# Patient Record
Sex: FEMALE | Race: WHITE | URBAN - METROPOLITAN AREA
[De-identification: names, ages, dates, MRNs, and addresses within clinical notes are randomized per-mention and may not be internally consistent; named-entity substitution may affect disease eponyms.]

---

## 2021-05-09 LAB
ANION GAP SERPL CALCULATED.3IONS-SCNC: NORMAL MMOL/L
BUN SERPL-MCNC: 6 MG/DL
CALCIUM SERPL-MCNC: 8.5 MG/DL
CHLORIDE SERPLBLD-SCNC: 107 MMOL/L
CO2 SERPL-SCNC: 25 MMOL/L
CREAT SERPL-MCNC: 0.69 MG/DL
ERYTHROCYTE [DISTWIDTH] IN BLOOD BY AUTOMATED COUNT: 12.5 %
GFR SERPL CREATININE-BSD FRML MDRD: 110 ML/MIN/1.73M2
GLUCOSE SERPL-MCNC: 92 MG/DL (ref 70–100)
HCT VFR BLD AUTO: 38.5 %
HEMOGLOBIN: 12.6 G/DL (ref 11.5–15.7)
MCH RBC QN AUTO: 30.9 PG
MCHC RBC AUTO-ENTMCNC: 32.7 G/DL
MCV RBC AUTO: 94.4 FL
PLATELET # BLD AUTO: 218 10^9/L
POTASSIUM SERPL-SCNC: 3.9 MMOL/L
RBC # BLD AUTO: 4.08 10^12/L
SODIUM SERPL-SCNC: 141 MMOL/L
WBC # BLD AUTO: 7.25 10^9/L

## 2021-05-11 ENCOUNTER — OFFICE VISIT (OUTPATIENT)
Dept: URGENT CARE | Facility: URGENT CARE | Age: 41
End: 2021-05-11
Payer: COMMERCIAL

## 2021-05-11 VITALS
BODY MASS INDEX: 21.69 KG/M2 | DIASTOLIC BLOOD PRESSURE: 70 MMHG | TEMPERATURE: 97.7 F | RESPIRATION RATE: 14 BRPM | SYSTOLIC BLOOD PRESSURE: 107 MMHG | WEIGHT: 135 LBS | HEART RATE: 65 BPM | HEIGHT: 66 IN | OXYGEN SATURATION: 97 %

## 2021-05-11 DIAGNOSIS — K12.2 ORAL INFECTION: Primary | ICD-10-CM

## 2021-05-11 PROCEDURE — 99203 OFFICE O/P NEW LOW 30 MIN: CPT | Performed by: FAMILY MEDICINE

## 2021-05-11 RX ORDER — SENNOSIDES A AND B 8.6 MG/1
8.6 TABLET, FILM COATED ORAL DAILY
COMMUNITY
Start: 2021-05-09

## 2021-05-11 RX ORDER — OXYCODONE HYDROCHLORIDE 5 MG/1
5 TABLET ORAL EVERY 8 HOURS PRN
COMMUNITY
Start: 2021-05-09 | End: 2021-05-11

## 2021-05-11 RX ORDER — ACETAMINOPHEN 500 MG
500 TABLET ORAL EVERY 6 HOURS PRN
COMMUNITY
Start: 2021-05-09

## 2021-05-11 RX ORDER — LEVETIRACETAM 500 MG/1
2000 TABLET ORAL 2 TIMES DAILY
COMMUNITY
Start: 2021-05-09 | End: 2021-05-16

## 2021-05-11 RX ORDER — ONDANSETRON 4 MG/1
4 TABLET, FILM COATED ORAL PRN
COMMUNITY
Start: 2021-05-09

## 2021-05-11 ASSESSMENT — MIFFLIN-ST. JEOR: SCORE: 1304.11

## 2021-05-11 NOTE — PROGRESS NOTES
"SUBJECTIVE: Asia Brown is a 41 year old female presenting with a chief complaint of oral infection.  Onset of symptoms was day(s) ago.  Course of illness is worsening.    Current and Associated symptoms: none  Treatment measures tried include None tried.  Predisposing factors include sutures placed.    No past medical history on file.  Allergies   Allergen Reactions     Liquid Adhesive Rash     Some Band Aids     Moxifloxacin Rash     Other reaction(s): Hives     Social History     Tobacco Use     Smoking status: Never Smoker     Smokeless tobacco: Never Used   Substance Use Topics     Alcohol use: Not on file       ROS:  SKIN: no rash  GI: no vomiting    OBJECTIVE:  /70 (BP Location: Right arm, Patient Position: Sitting, Cuff Size: Adult Regular)   Pulse 65   Temp 97.7  F (36.5  C) (Tympanic)   Resp 14   Ht 1.676 m (5' 6\")   Wt 61.2 kg (135 lb)   SpO2 97%   BMI 21.79 kg/m  GENERAL APPEARANCE: healthy, alert and no distress  Inner lower lip with discontinue and redness, slight swelling      ICD-10-CM    1. Oral infection  K12.2 amoxicillin-clavulanate (AUGMENTIN) 875-125 MG tablet       Fluids/Rest, f/u if worse/not any better    "

## 2021-05-12 ENCOUNTER — TRANSFERRED RECORDS (OUTPATIENT)
Dept: HEALTH INFORMATION MANAGEMENT | Facility: CLINIC | Age: 41
End: 2021-05-12

## 2021-05-18 ENCOUNTER — MEDICAL CORRESPONDENCE (OUTPATIENT)
Dept: HEALTH INFORMATION MANAGEMENT | Facility: CLINIC | Age: 41
End: 2021-05-18

## 2021-05-20 ENCOUNTER — PRE VISIT (OUTPATIENT)
Dept: CARDIOLOGY | Facility: CLINIC | Age: 41
End: 2021-05-20

## 2021-05-20 NOTE — TELEPHONE ENCOUNTER
Faxed [place] to [facility] [place]KG 5/8/2021.    Faxed request to N for notes from Dr. Metzger (referral to cardiology)

## 2021-05-26 NOTE — PROGRESS NOTES
HPI and Plan:    Ms. Asia Brown is 41 years old and experienced an episode of syncope.  [place] [facility] [place]ology follow-up was recommended by her neurologist.    With her episode of syncope, she sustained a concussion.  For several weeks preceding the event, she had awakened in the early morning hours and had difficulty sleeping.  She had 4 drinks of alcohol with dinner the night prior to the event - that was unusual in that she has perhaps 2 drinks in a month.  Apparently she awakened and went to the refrigerator and lost consciousness without typical warning symptoms.  Her  found her and she had no memory of events. With her [facility] [place], a lip laceration was repaired by the facial trauma team. Her repeat imaging did not demonstrate a previously-suspected subarachnoid hemorrhage and she remained without neurologic symptoms. Given concern for syncope, medicine was consulted and felt her presentation was consistent with vasovagal episode. No follow-up or additional work-up recommended. She does have a history of migraine headaches and sees Dr. Metzger at the [place] [facility].  She recommended a cardiology evaluation after the EEG was not suspicious for seizure activity.    Ms. Brown fainted twice as a child -apparently in [place].  She has had several events as an adult generally precipitated by pain or by medical procedures.  She typically has symptoms such as feeling warm, experiencing diaphoresis or lightheadedness and understands to sit down.  It sounds as though her events associated with medical procedures do not always occur in a standing position.  She denies any palpitations or chest discomfort.  She has noted shortness of breath with exertion since the event.  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  She has attributed the shortness of  breath to deconditioning and would like to gradually start exercising again.  She is currently on leave due to her concussion using PTO.  Prior to the event, she also had experienced 2 weeks of headaches which awakened her at night.  After sustaining the concussion, that symptom resolved.  She does not believe that she snores or has sleep apnea.    She notes her migraine headaches generally start as tension headaches and then turn into migraine headaches.  Her job has resulted in significant stress and is relatively high pressure in character.  At the time of her last syncopal event, she was also on propranolol for the migraine headaches though it did not seem to be helpful.  Of note, her father experienced a myocardial infarct at age 50 years and her mother experienced a myocardial infarct at age 52 years (she did smoke tobacco) and she has subsequently  of lung cancer.  She has at least 2 uncles who also experienced coronary events at relatively young ages.  She has not previously had significant dyslipidemia, does not smoke or have diabetes mellitus or have hypertension.    Exam:  The blood pressure was 117/78 mmHg.  This is a woman who appears relatively fit and a very normal weight.  The chest was clear to auscultation.  On cardiac auscultation, there was an S1 and S2 without extra sounds or murmur.  The rhythm was regular.    Assessment/Plan:  In assessment, Ms. Brown has a history that is consistent with vasovagal syncope.  This last more dramatic episode may have been precipitated by a combination of sleep deprivation preceding the event and alcohol the night before the event.  We discussed this in detail.  Nonetheless, further evaluation is indicated particularly given her more recent exertional dyspnea and her family history of significant coronary artery disease.  A stress echocardiogram was recommended both to evaluate the possible presence of structural heart disease and to evaluate her exercise  tolerance as well as the blood pressure and heart rate response to exercise.  Additionally, a 7-day Zio patch monitor was recommended to screen for unsuspected arrhythmias.  Follow-up with electrophysiology was recommended.  We discussed the spectrum of symptoms and possible triggers with vasovagal syncope and she has become aware of many of the triggers trying to avoid actual loss of consciousness.    Sleep deprivation increases likelihood of more symptomatic events with vagal hypersensitivity.  She may benefit from a sleep study even if sleep apnea is not the concern.  This was discussed so that it can be pursued if insomnia persists.    Finally, she has a very significant history of premature coronary disease in both parents.  Reevaluation of her risk factors (particularly lipids) and consideration of initiation of lipid-lowering therapy is likely indicated.  Follow-up with cardiology (and apart from electrophysiology) is recommended.  Given my impending care home, I have recommended follow-up with another of my partners.

## 2021-05-27 ENCOUNTER — OFFICE VISIT (OUTPATIENT)
Dept: CARDIOLOGY | Facility: CLINIC | Age: 41
End: 2021-05-27
Payer: COMMERCIAL

## 2021-05-27 VITALS
SYSTOLIC BLOOD PRESSURE: 117 MMHG | HEART RATE: 71 BPM | DIASTOLIC BLOOD PRESSURE: 78 MMHG | WEIGHT: 135 LBS | HEIGHT: 66 IN | BODY MASS INDEX: 21.69 KG/M2

## 2021-05-27 DIAGNOSIS — Z82.49 FAMILY HISTORY OF ISCHEMIC HEART DISEASE: ICD-10-CM

## 2021-05-27 DIAGNOSIS — R55 VASOVAGAL SYNCOPE: Primary | ICD-10-CM

## 2021-05-27 PROCEDURE — 99204 OFFICE O/P NEW MOD 45 MIN: CPT | Performed by: INTERNAL MEDICINE

## 2021-05-27 PROCEDURE — 93000 ELECTROCARDIOGRAM COMPLETE: CPT | Performed by: INTERNAL MEDICINE

## 2021-05-27 ASSESSMENT — MIFFLIN-ST. JEOR: SCORE: 1303.86

## 2021-05-27 NOTE — LETTER
5/27/2021    Eboni Nelson MD  [facility] [address]  [facility] [address]    RE: Asia Brown       Dear Colleague,    I had the pleasure of seeing Asia Brown in the [facility].    HPI and Plan:    Ms. Asia Brown is 41 years old and experienced an episode of syncope.  She [place] [facility] and cardiology follow-up was recommended by her neurologist.    With her episode of syncope, she sustained a concussion.  For several weeks preceding the event, she had awakened in the early morning hours and had difficulty sleeping.  She had 4 drinks of alcohol with dinner the night prior to the event - that was unusual in that she has perhaps 2 drinks in a month.  Apparently she awakened and went to the refrigerator and lost consciousness without typical warning symptoms.  Her  found her and she had no memory of events. With her [facility] [place], a lip laceration was repaired by the facial trauma team. Her repeat imaging did not demonstrate a previously-suspected subarachnoid hemorrhage and she remained without neurologic symptoms. Given concern for syncope, medicine was consulted and felt her presentation was consistent with vasovagal episode. No follow-up or additional work-up recommended. She does have a history of migraine headaches and sees Dr. Metzger at the [place] [facility].  She recommended a cardiology evaluation after the EEG was not suspicious for seizure activity.    Ms. Brown fainted twice as a child -apparently in [place].  She has had several events as an adult generally precipitated by pain or by medical procedures.  She typically has symptoms such as feeling warm, experiencing diaphoresis or lightheadedness and understands to sit down.  It sounds as though her events associated with medical procedures do not always occur in a standing position.  She denies any palpitations or chest  discomfort.  She has noted shortness of breath with exertion since the event.  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  She has attributed the shortness of breath to deconditioning and would like to gradually start exercising again.  She is currently on leave due to her concussion using PTO.  Prior to the event, she also had experienced 2 weeks of headaches which awakened her at night.  After sustaining the concussion, that symptom resolved.  She does not believe that she snores or has sleep apnea.    She notes her migraine headaches generally start as tension headaches and then turn into migraine headaches.  Her job has resulted in significant stress and is relatively high pressure in character.  At the time of her last syncopal event, she was also on propranolol for the migraine headaches though it did not seem to be helpful.  Of note, her father experienced a myocardial infarct at age 50 years and her mother experienced a myocardial infarct at age 52 years (she did smoke tobacco) and she has subsequently  of lung cancer.  She has at least 2 uncles who also experienced coronary events at relatively young ages.  She has not previously had significant dyslipidemia, does not smoke or have diabetes mellitus or have hypertension.    Exam:  The blood pressure was 117/78 mmHg.  This is a woman who appears relatively fit and a very normal weight.  The chest was clear to auscultation.  On cardiac auscultation, there was an S1 and S2 without extra sounds or murmur.  The rhythm was regular.    Assessment/Plan:  In assessment, Ms. Brown has a history that is consistent with vasovagal syncope.  This last more dramatic episode may have been precipitated by a combination of sleep deprivation preceding the event and alcohol the night before the event.  We discussed this in detail.  Nonetheless, further  evaluation is indicated particularly given her more recent exertional dyspnea and her family history of significant coronary artery disease.  A stress echocardiogram was recommended both to evaluate the possible presence of structural heart disease and to evaluate her exercise tolerance as well as the blood pressure and heart rate response to exercise.  Additionally, a 7-day Zio patch monitor was recommended to screen for unsuspected arrhythmias.  Follow-up with electrophysiology was recommended.  We discussed the spectrum of symptoms and possible triggers with vasovagal syncope and she has become aware of many of the triggers trying to avoid actual loss of consciousness.    Sleep deprivation increases likelihood of more symptomatic events with vagal hypersensitivity.  She may benefit from a sleep study even if sleep apnea is not the concern.  This was discussed so that it can be pursued if insomnia persists.    Finally, she has a very significant history of premature coronary disease in both parents.  Reevaluation of her risk factors (particularly lipids) and consideration of initiation of lipid-lowering therapy is likely indicated.  Follow-up with cardiology (and apart from electrophysiology) is recommended.  Given my impending senior care, I have recommended follow-up with another of my partners.      Thank you for allowing me to participate in the care of your patient.      Sincerely,     America Keane MD     [facility]  cc:   No referring provider defined for this encounter.

## 2021-05-27 NOTE — LETTER
Date:July 20, 2021      Patient was self referred, no letter generated. Do not send.        [facility] Information

## 2021-06-16 ENCOUNTER — HOSPITAL ENCOUNTER (OUTPATIENT)
Dept: CARDIOLOGY | Facility: CLINIC | Age: 41
End: 2021-06-16
Attending: INTERNAL MEDICINE
Payer: COMMERCIAL

## 2021-06-16 ENCOUNTER — TELEPHONE (OUTPATIENT)
Dept: CARDIOLOGY | Facility: CLINIC | Age: 41
End: 2021-06-16

## 2021-06-16 DIAGNOSIS — R55 VASOVAGAL SYNCOPE: ICD-10-CM

## 2021-06-16 DIAGNOSIS — R55 VASOVAGAL SYNCOPE: Primary | ICD-10-CM

## 2021-06-16 PROCEDURE — C8928 TTE W OR W/O FOL W/CON,STRES: HCPCS

## 2021-06-16 PROCEDURE — 999N000208 ECHO STRESS ECHOCARDIOGRAM

## 2021-06-16 PROCEDURE — 93321 DOPPLER ECHO F-UP/LMTD STD: CPT | Mod: 26 | Performed by: INTERNAL MEDICINE

## 2021-06-16 PROCEDURE — 255N000002 HC RX 255 OP 636: Performed by: INTERNAL MEDICINE

## 2021-06-16 PROCEDURE — 93016 CV STRESS TEST SUPVJ ONLY: CPT | Performed by: INTERNAL MEDICINE

## 2021-06-16 PROCEDURE — 93350 STRESS TTE ONLY: CPT | Mod: 26 | Performed by: INTERNAL MEDICINE

## 2021-06-16 PROCEDURE — 93244 EXT ECG>48HR<7D REV&INTERPJ: CPT | Performed by: INTERNAL MEDICINE

## 2021-06-16 PROCEDURE — 93018 CV STRESS TEST I&R ONLY: CPT | Performed by: INTERNAL MEDICINE

## 2021-06-16 PROCEDURE — 93242 EXT ECG>48HR<7D RECORDING: CPT

## 2021-06-16 PROCEDURE — 93325 DOPPLER ECHO COLOR FLOW MAPG: CPT | Mod: 26 | Performed by: INTERNAL MEDICINE

## 2021-06-16 RX ADMIN — HUMAN ALBUMIN MICROSPHERES AND PERFLUTREN 9 ML: 10; .22 INJECTION, SOLUTION INTRAVENOUS at 09:19

## 2021-06-16 NOTE — TELEPHONE ENCOUNTER
Stress echo 6-16-21  1. Above average exercise capacity, 97% max HR achieved.  2. The patient exhibited no chest pain during exercise.  3. This was a normal stress EKG with no evidence of stress-induced ischemia.  4. Rest echo: Normal left ventricular function and wall motion at rest. The  visual ejection fraction is estimated at 55-60%.  5. Stress echo: This was a normal stress echocardiogram with no evidence of  stress-induced ischemia. The visual ejection fraction is estimated at 65-70%    ZIO patch placed 6-16-21  Dr. Medrano visit 7-16-21    Last Dr. Keane visit 5-27-21 - episode of syncope, she sustained a concussion.   stress echocardiogram was recommended both to evaluate the possible presence of structural heart disease and to evaluate her exercise tolerance as well as the blood pressure and heart rate response to exercise.  Additionally, a 7-day Zio patch monitor    Dr. Keane to review

## 2021-06-17 NOTE — TELEPHONE ENCOUNTER
America Keane MD Theis, Marcie J, RN    Cc: [facility] Team 2   Caller: Unspecified (Yesterday, 10:02 AM)             Good result and HR, BP responses wee normal.   Thanks.   CD        Spoke to patient, reviewed stress echo and message from Dr Keane as above. Pt verbalized understanding, no questions at this time. Team 2 will follow up with patient once monitor results are available.

## 2021-07-16 ENCOUNTER — OFFICE VISIT (OUTPATIENT)
Dept: CARDIOLOGY | Facility: CLINIC | Age: 41
End: 2021-07-16
Payer: COMMERCIAL

## 2021-07-16 VITALS
DIASTOLIC BLOOD PRESSURE: 82 MMHG | BODY MASS INDEX: 21.38 KG/M2 | HEIGHT: 66 IN | SYSTOLIC BLOOD PRESSURE: 121 MMHG | WEIGHT: 133 LBS | HEART RATE: 77 BPM

## 2021-07-16 DIAGNOSIS — R55 VASOVAGAL SYNCOPE: ICD-10-CM

## 2021-07-16 PROCEDURE — 99204 OFFICE O/P NEW MOD 45 MIN: CPT | Performed by: INTERNAL MEDICINE

## 2021-07-16 ASSESSMENT — MIFFLIN-ST. JEOR: SCORE: 1295.03

## 2021-07-16 NOTE — LETTER
7/16/2021    Eboni Nelson MD  [facility] [address]  [facility] [address]    RE: Asia DAQUAN Brown       Dear Colleague,    I had the pleasure of seeing Asia DAQUAN Brown in the [facility].    Electrophysiology/ Clinic Note         H&P and Plan:     REASON FOR VISIT: Electrophysiology evaluation.      HISTORY OF PRESENT ILLNESS: Patient is a 41-year-old lady with a history of migraine, who is here for evaluation of syncope.    Patient has a long history of syncope.  She has had 4 episodes of syncope in the past most episodes are preceded by lightheadedness dizziness and diaphoresis.  She is known to lay down and abort her symptoms.    May of this year, she had more pronounced episode.  She woke up and went to the refrigerator.  She became lightheaded and lost consciousness.  She remembers she was in the floor.  Due to the episode of syncope, she lost her 2 front teeth.  [place] [facility] [place] was also found to have a subarachnoid hemorrhage.  Repeated scans showed resolution of the hemorrhage.    She was evaluated in clinic care by my colleague Dr. Keane (who recently retired), and underwent an extensive work-up including a stress echo which was negative for ischemia and revealed normal cardiac function.  Zio patch monitor was also negative for arrhythmias..    At the moment, she is doing well and denies any recurrence of syncope.  She continues to be active and walks daily and does yoga twice a week.  She denies any other symptoms of chest pain, shortness of breath or lightheadedness.    Baseline EKG showed normal sinus rhythm with out signs of preexcitation.        ASSESSMENT AND PLAN:   1.    Syncope.  Clinical picture is suggestive of vasovagal.  Cardiac function is normal, stress test was negative and monitor was unrevealing.  Today, we discussed possibly of loop recorder implantation for close monitoring.  However she is not  "interested at this time.    I recommend the following:  - Increase hydration and salt intake.  - Consider having salt tablets handing  - Increase aerobic activity   - Consider wearing compression stockings  - Avoid  hot crowded environments.  - Avoid standing or sitting for a long time periods of time.  If you to stay in these positions for long period of time, consider tense your leg muscles to help keep blood moving.  - [place] of supine position if you become lightheaded or dizzy.    She will follow-up with us on as-needed basis.    2.  Hyperlipidemia.  She had significant premature family history of coronary disease.  Her recent cholesterol level was markedly elevated (total cholesterol of 270, LDL of 190 and HDL 42).  I recommend strict with a Mediterranean diet.  She will recheck lipids with PCP in a couple months.  She will follow-up with our preventive cardiology in the near to reevaluate hyperlipidemia.      Garth Medrano MD    Physical Exam:  Vitals: /82   Pulse 77   Ht 1.676 m (5' 6\")   Wt 60.3 kg (133 lb)   BMI 21.47 kg/m      Constitutional:  AAO x3.  Pt is in NAD.  HEAD: normocephalic.  SKIN: Skin normal color, texture and turgor with no lesions or eruptions.  Eyes: PERRL, EOMI.  ENT:  Supple, normal JVP. No lymphadenopathy or thyroid enlargement.  Chest:  CTAB  Cardiac: RRR, normal  S1 and S2.  No murmurs rubs or gallop.    Abdomen:  Normal BS.  Soft, non-tender and non-distended.  No rebound or guarding.    Extremities:  Pedious pulses palpable B/L.  No LE edema noticed.   Neurological: Strength and sensation grossly symmetric and intact throughout.       CURRENT MEDICATIONS:  Current Outpatient Medications   Medication Sig Dispense Refill     acetaminophen (TYLENOL) 500 MG tablet Take 500 mg by mouth every 6 hours as needed       Multiple Vitamins-Minerals (WOMENS MULTI VITAMIN & MINERAL PO) Take by mouth daily       ondansetron (ZOFRAN) 4 MG tablet Take 4 mg by mouth as needed       " senna (SENOKOT) 8.6 MG tablet Take 8.6 mg by mouth daily       levETIRAcetam (KEPPRA) 500 MG tablet Take 2,000 mg by mouth 2 times daily         ALLERGIES     Allergies   Allergen Reactions     Liquid Adhesive Rash     Some Band Aids     Moxifloxacin Rash     Other reaction(s): Hives       PAST MEDICAL HISTORY:  Past Medical History:   Diagnosis Date     Migraine      Vasovagal syncope     May 2021       PAST SURGICAL HISTORY:  No past surgical history on file.    FAMILY HISTORY:  History reviewed. No pertinent family history.    SOCIAL HISTORY:  Social History     Socioeconomic History     Marital status:      Spouse name: None     Number of children: None     Years of education: None     Highest education level: None   Occupational History     None   Tobacco Use     Smoking status: Never Smoker     Smokeless tobacco: Never Used   Substance and Sexual Activity     Alcohol use: Not Currently     Drug use: None     Sexual activity: None   Other Topics Concern     Parent/sibling w/ CABG, MI or angioplasty before 65F 55M? Not Asked   Social History Narrative     None     Social Determinants of Health     Financial Resource Strain:      Difficulty of Paying Living Expenses:    Food Insecurity:      Worried About Running Out of Food in the Last Year:      Ran Out of Food in the Last Year:    Transportation Needs:      Lack of Transportation (Medical):      Lack of Transportation (Non-Medical):    Physical Activity:      Days of Exercise per Week:      Minutes of Exercise per Session:    Stress:      Feeling of Stress :    Social Connections:      Frequency of Communication with Friends and Family:      Frequency of Social Gatherings with Friends and Family:      Attends [employer] Services:      Active Member of Clubs or Organizations:      Attends Club or Organization Meetings:      Marital Status:    Intimate Partner Violence:      Fear of Current or Ex-Partner:      Emotionally Abused:      Physically Abused:       Sexually Abused:        Review of Systems:  Skin:  Negative     Eyes:  Negative    ENT:  Negative    Respiratory:  Positive for dyspnea on exertion  Cardiovascular:  Negative for;lightheadedness;palpitations fatigue;Positive for;syncope or near-syncope  Gastroenterology: Negative    Genitourinary:  Negative    Musculoskeletal:  Positive for neck pain  Neurologic:  Positive for headaches  Psychiatric:  Positive for anxiety  Heme/Lymph/Imm:  Negative    Endocrine:  Negative        Recent Lab Results:  LIPID RESULTS:  No results found for: CHOL, HDL, LDL, TRIG, CHOLHDLRATIO    LIVER ENZYME RESULTS:  No results found for: AST, ALT    CBC RESULTS:  Lab Results   Component Value Date    WBC 7.25 05/09/2021    RBC 4.08 05/09/2021    HGB 12.6 05/09/2021    HCT 38.5 05/09/2021    MCV 94.4 05/09/2021    MCH 30.9 05/09/2021    MCHC 32.7 05/09/2021    RDW 12.5 05/09/2021     05/09/2021       BMP RESULTS:  Lab Results   Component Value Date     05/09/2021    POTASSIUM 3.9 05/09/2021    CHLORIDE 107 05/09/2021    CO2 25 05/09/2021    GLC 92 05/09/2021    BUN 6 05/09/2021    CR 0.69 05/09/2021    GFRESTIMATED 110 05/09/2021    MIRYAM 8.5 05/09/2021        A1C RESULTS:  No results found for: A1C    INR RESULTS:  No results found for: INR      ECHOCARDIOGRAM  No results found for this or any previous visit (from the past 8760 hour(s)).      No orders of the defined types were placed in this encounter.    No orders of the defined types were placed in this encounter.    There are no discontinued medications.      Encounter Diagnosis   Name Primary?     Vasovagal syncope          CC  America Keane MD  [address]  [place] [address]                  Thank you for allowing me to participate in the care of your patient.      Sincerely,     Garth Medrano MD     [facility]  cc:   America Keane MD  [address]  [place] [address]

## 2021-07-16 NOTE — PROGRESS NOTES
Electrophysiology/ Clinic Note         H&P and Plan:     REASON FOR VISIT: Electrophysiology evaluation.      HISTORY OF PRESENT ILLNESS: Patient is a 41-year-old lady with a history of migraine, who is here for evaluation of syncope.    Patient has a long history of syncope.  She has had 4 episodes of syncope in the past most episodes are preceded by lightheadedness dizziness and diaphoresis.  She is known to lay down and abort her symptoms.    May of this year, she had more pronounced episode.  She woke up and went to the refrigerator.  She became lightheaded and lost consciousness.  She remembers she was in the floor.  Due to the episode of syncope, she lost her 2 front teeth.  [place] [facility] [place] also found to have a subarachnoid hemorrhage.  Repeated scans showed resolution of the hemorrhage.    She was evaluated in clinic care by my colleague Dr. Keane (who recently retired), and underwent an extensive work-up including a stress echo which was negative for ischemia and revealed normal cardiac function.  Zio patch monitor was also negative for arrhythmias..    At the moment, she is doing well and denies any recurrence of syncope.  She continues to be active and walks daily and does yoga twice a week.  She denies any other symptoms of chest pain, shortness of breath or lightheadedness.    Baseline EKG showed normal sinus rhythm with out signs of preexcitation.        ASSESSMENT AND PLAN:   1.    Syncope.  Clinical picture is suggestive of vasovagal.  Cardiac function is normal, stress test was negative and monitor was unrevealing.  Today, we discussed possibly of loop recorder implantation for close monitoring.  However she is not interested at this time.    I recommend the following:  - Increase hydration and salt intake.  - Consider having salt tablets handing  - Increase aerobic activity   - Consider wearing compression stockings  - Avoid  hot crowded environments.  - Avoid standing or sitting for a  "long time periods of time.  If you to stay in these positions for long period of time, consider tense your leg muscles to help keep blood moving.  - Ten Sleep of supine position if you become lightheaded or dizzy.    She will follow-up with us on as-needed basis.    2.  Hyperlipidemia.  She had significant premature family history of coronary disease.  Her recent cholesterol level was markedly elevated (total cholesterol of 270, LDL of 190 and HDL 42).  I recommend strict with a Mediterranean diet.  She will recheck lipids with PCP in a couple months.  She will follow-up with our preventive cardiology in the near to reevaluate hyperlipidemia.      Garth Medrano MD    Physical Exam:  Vitals: /82   Pulse 77   Ht 1.676 m (5' 6\")   Wt 60.3 kg (133 lb)   BMI 21.47 kg/m      Constitutional:  AAO x3.  Pt is in NAD.  HEAD: normocephalic.  SKIN: Skin normal color, texture and turgor with no lesions or eruptions.  Eyes: PERRL, EOMI.  ENT:  Supple, normal JVP. No lymphadenopathy or thyroid enlargement.  Chest:  CTAB  Cardiac: RRR, normal  S1 and S2.  No murmurs rubs or gallop.    Abdomen:  Normal BS.  Soft, non-tender and non-distended.  No rebound or guarding.    Extremities:  Pedious pulses palpable B/L.  No LE edema noticed.   Neurological: Strength and sensation grossly symmetric and intact throughout.       CURRENT MEDICATIONS:  Current Outpatient Medications   Medication Sig Dispense Refill     acetaminophen (TYLENOL) 500 MG tablet Take 500 mg by mouth every 6 hours as needed       Multiple Vitamins-Minerals (WOMENS MULTI VITAMIN & MINERAL PO) Take by mouth daily       ondansetron (ZOFRAN) 4 MG tablet Take 4 mg by mouth as needed       senna (SENOKOT) 8.6 MG tablet Take 8.6 mg by mouth daily       levETIRAcetam (KEPPRA) 500 MG tablet Take 2,000 mg by mouth 2 times daily         ALLERGIES     Allergies   Allergen Reactions     Liquid Adhesive Rash     Some Band Aids     Moxifloxacin Rash     Other reaction(s): " Hives       PAST MEDICAL HISTORY:  Past Medical History:   Diagnosis Date     Migraine      Vasovagal syncope     May 2021       PAST SURGICAL HISTORY:  No past surgical history on file.    FAMILY HISTORY:  History reviewed. No pertinent family history.    SOCIAL HISTORY:  Social History     Socioeconomic History     Marital status:      Spouse name: None     Number of children: None     Years of education: None     Highest education level: None   Occupational History     None   Tobacco Use     Smoking status: Never Smoker     Smokeless tobacco: Never Used   Substance and Sexual Activity     Alcohol use: Not Currently     Drug use: None     Sexual activity: None   Other Topics Concern     Parent/sibling w/ CABG, MI or angioplasty before 65F 55M? Not Asked   Social History Narrative     None     Social Determinants of Health     Financial Resource Strain:      Difficulty of Paying Living Expenses:    Food Insecurity:      Worried About Running Out of Food in the Last Year:      Ran Out of Food in the Last Year:    Transportation Needs:      Lack of Transportation (Medical):      Lack of Transportation (Non-Medical):    Physical Activity:      Days of Exercise per Week:      Minutes of Exercise per Session:    Stress:      Feeling of Stress :    Social Connections:      Frequency of Communication with Friends and Family:      Frequency of Social Gatherings with Friends and Family:      Attends [employer] Services:      Active Member of Clubs or Organizations:      Attends Club or Organization Meetings:      Marital Status:    Intimate Partner Violence:      Fear of Current or Ex-Partner:      Emotionally Abused:      Physically Abused:      Sexually Abused:        Review of Systems:  Skin:  Negative     Eyes:  Negative    ENT:  Negative    Respiratory:  Positive for dyspnea on exertion  Cardiovascular:  Negative for;lightheadedness;palpitations fatigue;Positive for;syncope or near-syncope  Gastroenterology:  Negative    Genitourinary:  Negative    Musculoskeletal:  Positive for neck pain  Neurologic:  Positive for headaches  Psychiatric:  Positive for anxiety  Heme/Lymph/Imm:  Negative    Endocrine:  Negative        Recent Lab Results:  LIPID RESULTS:  No results found for: CHOL, HDL, LDL, TRIG, CHOLHDLRATIO    LIVER ENZYME RESULTS:  No results found for: AST, ALT    CBC RESULTS:  Lab Results   Component Value Date    WBC 7.25 05/09/2021    RBC 4.08 05/09/2021    HGB 12.6 05/09/2021    HCT 38.5 05/09/2021    MCV 94.4 05/09/2021    MCH 30.9 05/09/2021    MCHC 32.7 05/09/2021    RDW 12.5 05/09/2021     05/09/2021       BMP RESULTS:  Lab Results   Component Value Date     05/09/2021    POTASSIUM 3.9 05/09/2021    CHLORIDE 107 05/09/2021    CO2 25 05/09/2021    GLC 92 05/09/2021    BUN 6 05/09/2021    CR 0.69 05/09/2021    GFRESTIMATED 110 05/09/2021    MIRYAM 8.5 05/09/2021        A1C RESULTS:  No results found for: A1C    INR RESULTS:  No results found for: INR      ECHOCARDIOGRAM  No results found for this or any previous visit (from the past 8760 hour(s)).      No orders of the defined types were placed in this encounter.    No orders of the defined types were placed in this encounter.    There are no discontinued medications.      Encounter Diagnosis   Name Primary?     Vasovagal syncope          CC  America Keane MD  [address]  [place] [id number]

## 2025-05-05 ENCOUNTER — TRANSFERRED RECORDS (OUTPATIENT)
Dept: HEALTH INFORMATION MANAGEMENT | Facility: CLINIC | Age: 45
End: 2025-05-05
Payer: COMMERCIAL

## 2025-05-16 ENCOUNTER — MEDICAL CORRESPONDENCE (OUTPATIENT)
Dept: HEALTH INFORMATION MANAGEMENT | Facility: CLINIC | Age: 45
End: 2025-05-16
Payer: COMMERCIAL

## 2025-05-19 ENCOUNTER — TRANSCRIBE ORDERS (OUTPATIENT)
Dept: OTHER | Age: 45
End: 2025-05-19

## 2025-05-19 ENCOUNTER — PATIENT OUTREACH (OUTPATIENT)
Dept: CARE COORDINATION | Facility: CLINIC | Age: 45
End: 2025-05-19
Payer: COMMERCIAL

## 2025-05-19 DIAGNOSIS — N39.0 FREQUENT UTI: Primary | ICD-10-CM

## 2025-05-20 ENCOUNTER — PATIENT OUTREACH (OUTPATIENT)
Dept: CARE COORDINATION | Facility: CLINIC | Age: 45
End: 2025-05-20
Payer: COMMERCIAL

## 2025-05-21 ENCOUNTER — LAB (OUTPATIENT)
Dept: LAB | Facility: CLINIC | Age: 45
End: 2025-05-21
Payer: COMMERCIAL

## 2025-05-21 DIAGNOSIS — J32.9 FREQUENT EPISODES OF SINUSITIS: ICD-10-CM

## 2025-05-21 DIAGNOSIS — N39.0 UTI (URINARY TRACT INFECTION): ICD-10-CM

## 2025-05-21 LAB
ALBUMIN UR-MCNC: NEGATIVE MG/DL
APPEARANCE UR: CLEAR
BACTERIA #/AREA URNS HPF: ABNORMAL /HPF
BILIRUB UR QL STRIP: NEGATIVE
COLOR UR AUTO: YELLOW
GLUCOSE UR STRIP-MCNC: NEGATIVE MG/DL
HGB UR QL STRIP: NEGATIVE
KETONES UR STRIP-MCNC: NEGATIVE MG/DL
LEUKOCYTE ESTERASE UR QL STRIP: ABNORMAL
NITRATE UR QL: NEGATIVE
PH UR STRIP: 6 [PH] (ref 5–7)
RBC #/AREA URNS AUTO: ABNORMAL /HPF
SP GR UR STRIP: 1.01 (ref 1–1.03)
SQUAMOUS #/AREA URNS AUTO: ABNORMAL /LPF
UROBILINOGEN UR STRIP-ACNC: 0.2 E.U./DL
WBC #/AREA URNS AUTO: ABNORMAL /HPF

## 2025-05-21 PROCEDURE — 87086 URINE CULTURE/COLONY COUNT: CPT

## 2025-05-21 PROCEDURE — 81001 URINALYSIS AUTO W/SCOPE: CPT

## 2025-05-22 ENCOUNTER — PATIENT OUTREACH (OUTPATIENT)
Dept: CARE COORDINATION | Facility: CLINIC | Age: 45
End: 2025-05-22
Payer: COMMERCIAL

## 2025-05-22 LAB — BACTERIA UR CULT: NORMAL

## 2025-05-29 ENCOUNTER — OFFICE VISIT (OUTPATIENT)
Dept: INFECTIOUS DISEASES | Facility: CLINIC | Age: 45
End: 2025-05-29
Attending: STUDENT IN AN ORGANIZED HEALTH CARE EDUCATION/TRAINING PROGRAM
Payer: COMMERCIAL

## 2025-05-29 ENCOUNTER — TELEPHONE (OUTPATIENT)
Dept: INFECTIOUS DISEASES | Facility: CLINIC | Age: 45
End: 2025-05-29
Payer: COMMERCIAL

## 2025-05-29 ENCOUNTER — TELEPHONE (OUTPATIENT)
Dept: INFECTIOUS DISEASES | Facility: CLINIC | Age: 45
End: 2025-05-29

## 2025-05-29 ENCOUNTER — TRANSFERRED RECORDS (OUTPATIENT)
Dept: HEALTH INFORMATION MANAGEMENT | Facility: CLINIC | Age: 45
End: 2025-05-29

## 2025-05-29 VITALS
BODY MASS INDEX: 21.38 KG/M2 | SYSTOLIC BLOOD PRESSURE: 117 MMHG | TEMPERATURE: 98.6 F | WEIGHT: 133 LBS | HEART RATE: 70 BPM | HEIGHT: 66 IN | DIASTOLIC BLOOD PRESSURE: 78 MMHG

## 2025-05-29 DIAGNOSIS — N39.0 RECURRENT UTI: Primary | ICD-10-CM

## 2025-05-29 DIAGNOSIS — Z91.89 AT RISK FOR PROLONGED QT INTERVAL SYNDROME: ICD-10-CM

## 2025-05-29 LAB
ALBUMIN UR-MCNC: NEGATIVE MG/DL
APPEARANCE UR: CLEAR
ATRIAL RATE - MUSE: 60 BPM
BILIRUB UR QL STRIP: NEGATIVE
COLOR UR AUTO: ABNORMAL
DIASTOLIC BLOOD PRESSURE - MUSE: NORMAL MMHG
GLUCOSE UR STRIP-MCNC: NEGATIVE MG/DL
HGB UR QL STRIP: ABNORMAL
INTERPRETATION ECG - MUSE: NORMAL
KETONES UR STRIP-MCNC: NEGATIVE MG/DL
LEUKOCYTE ESTERASE UR QL STRIP: ABNORMAL
NITRATE UR QL: NEGATIVE
P AXIS - MUSE: 37 DEGREES
PH UR STRIP: 7 [PH] (ref 5–7)
PR INTERVAL - MUSE: 158 MS
QRS DURATION - MUSE: 96 MS
QT - MUSE: 422 MS
QTC - MUSE: 422 MS
R AXIS - MUSE: 93 DEGREES
RBC URINE: 1 /HPF
SP GR UR STRIP: 1.01 (ref 1–1.03)
SQUAMOUS EPITHELIAL: 2 /HPF
SYSTOLIC BLOOD PRESSURE - MUSE: NORMAL MMHG
T AXIS - MUSE: 76 DEGREES
UROBILINOGEN UR STRIP-MCNC: NORMAL MG/DL
VENTRICULAR RATE- MUSE: 60 BPM
WBC URINE: 1 /HPF

## 2025-05-29 PROCEDURE — 81001 URINALYSIS AUTO W/SCOPE: CPT | Performed by: STUDENT IN AN ORGANIZED HEALTH CARE EDUCATION/TRAINING PROGRAM

## 2025-05-29 PROCEDURE — 99213 OFFICE O/P EST LOW 20 MIN: CPT | Performed by: STUDENT IN AN ORGANIZED HEALTH CARE EDUCATION/TRAINING PROGRAM

## 2025-05-29 ASSESSMENT — PAIN SCALES - GENERAL: PAINLEVEL_OUTOF10: MILD PAIN (2)

## 2025-05-29 NOTE — PATIENT INSTRUCTIONS
- Plan to check urinalysis with reflex to urine culture  - If UA is abnormal or positive culture, anticipate treatment as UTI  - If UA is normal and/or culture negative, recommend supportive care  - Please reach out if questions or concerns  - Follow up with Dr Stephenson 7/16/25

## 2025-05-29 NOTE — TELEPHONE ENCOUNTER
Called pt after she called call center on morning of 5/29. Pt reported additional S/S which was relayed to Dr Stephenson via telephone. Pt called again to confirm 11 a.m. 5/29/25 appt with Dr Richter.     Tigist Das RN

## 2025-05-29 NOTE — TELEPHONE ENCOUNTER
Writer spoke with Dr Stephenson morning of 5/29. Provider advised pt see Dr Richter at 11 am 5/29 (open appt) for evaluation.     Tigist Das RN

## 2025-05-29 NOTE — LETTER
"5/29/2025       RE: Asia Brown  [address] [facility] [id number]     Dear Colleague,    Thank you for referring your patient, Asia Brown, to the [facility] INFECT[facility] DISE[facility] CLINIC [place] at [facility]. Please see a copy of my visit note below.      [facility] INFECTIOUS DISEASE  [address]  [address]  [facility] [address]  Phone: [phone]  Fax: [phone]    Patient:  Asia Brown, Date of birth 1980  Date of Visit:  05/29/2025  Referring Provider Britni Bauer  Reason for visit: recurrent UTI      Assessment & Plan   UTI (urinary tract infection), recurrent  Migraines  History of successive dental interventions in 2024, none anticipated upcoming.    It is possible that her ESBL K pneumoniae was selected for by her UTI treatment in 2025 alone; it is also possible that frequent antibiotics in 2024 during her dental procedures also added selective pressure.  Her report of a post-coital tendency toward UTI in the 6 or so months after ceasing hormonal oral contraceptive therapy (for migraine control) is interesting. While Asia still has menses, she may be experiencing perimenopause and may have relative de-estrogenation of her vaginal tissue.     Her ESBL K pneumoniae has a minimal inhibitory concentration (CHEN) for levofloxacin of 1. While the interpretation of this value is \"resistant,\" I actually suspect that if given levofloxacin her urinary concentration of levofloxacin will far exceed the (CHEN). She received 3 days of Levaquin through 5/23/25    Levaquin was poorly tolerated with a multitude of systemic side effects and stopped 3 days into course which should have been sufficient for treatment of a cystitis. Since stopping Levaquin, most of her side effects have improved. She does report a burning sensation on her flank and frequent urination (however admits she is drinking a lot of water). Physical " "exam unremarkable - no flank rash or tenderness. No abdominal/suprapubic tenderness. No other symptoms/red flag signs to suggest a UTI    Given chest pressure, we checked an EKG in clinic today. No concerning T wave changes. Qtc 422 msec    Recommendations (from patient instructions):  - Plan to check UA with reflex to culture  - If UA is abnormal or positive culture, anticipate treatment as UTI  - If UA is normal and/or culture negative, recommend supportive care  - Follow up with Dr Stephenson 7/16/25    I spent 46 mins on date of encounter between chart review (including prior notes, labs, micro data and imaging), patient visit, documentation and care coordination, including communication with MALA Aquino  Staff Physician, Infectious Diseases  Pager [phone]      Interval History:   Patient was last seen by Dr Stephenson 5/21/25. She was then prescribed Levaquin 750mg/d with plans for a 7 day course  However, only took 3 days of medications as she had multiple side effects - headache, dizziness, sore joints, chest discomfort. Chest discomfort was most worrying symptom to her - described as pressure like sensation over left chest which came on and off. Last dose 5/23/25  With cessation of antibiotics, felt better   Since then, she reports having a burning sensation on her flanks - describes it as \"applying icy hot to my back\"  Reports frequent urination, but attributes that to her drinking more water  No nausea, vomiting. No fevers, chills. No dysuria, urgency, foul smelling urine - symptoms she usually has when she has a UTI    History of Present Illness    Pertinent history obtain from: chart review and patient    This is a 44 y/o woman with PMh migraines presenting for a recent history of frequent urinary tract infections (UTIs).    Prior to Jan 2025, Asia rarely experienced UTI.  2024 was an eventful year. In July 2024, she had a wisdom tooth extraction, and had a complication involving " her L maxillary sinus (due to a complication with a L upper wisdom tooth). This was accompanied by 8 weeks of antibiotics. From her report, her sinus symptoms were constant, and while infection for 8 weeks was not suspected, she was maintained on antibiotics during the dental management of her wisdom tooth extraction complication.  She then proceeded to have a crown placed (on a separate tooth), but had a complicating tooth infection that ultimately led to a root canal and entailed another course of antibiotics. I don't have access to her medication records. She thinks she may have been prescribed doxycycline.    In January 2025, she experienced a UTI. The symptoms were frequency with hesitancy, dysuria. The timing was post-coital. She was initially prescribed amoxicillin, then her provider noticed that the organism was K pneumonia (with intrinsic resistance to amoxicillin) so she was changed to nitrofurantoin. She had full relief of symptoms.    In Mar 2025, she experienced another UTI. The timing also seemed to be post-coital. She again recalls having been prescribed nitrofurantoin. She had complete relief of symptoms.    In May 2025, she again experienced UTI. She was prescribed amox/clavulanate. She took this for 5 days, but then got a call that her isolate was an ESBL K pneumoniae, and she was switched to nitrofurantoin. While on the nitrofurantoin, her symptoms gradually returned. She has had no fever, but she may have some aching in her L flank, though she's not sure if this is attributable to her menses or symptoms from a UTI.    The K pneumoniae sensitivity panel is faxed to us. The images is below.     On additional history, Asia reports that she went off her oral contraceptive in 2024. She had previously taken this for control of her migraines. However, she was ready to stop taking the med as she preferred to avoid systemic exposure to hormones. Her provider prepared her for her migraines to return, but  "fortunately she's had no worsening of her migraines and she seems happy with having gone off her contraceptive.    She is otherwise healthy and active. Her review of systems apart from the above is negative.    Specialty Problems    None       Physical Exam    Vital signs:  /78   Pulse 70   Temp 98.6  F (37  C) (Oral)   Ht 1.676 m (5' 6\")   Wt 60.3 kg (133 lb)   BMI 21.47 kg/m      GENERAL: alert and no distress  NECK: no adenopathy, no asymmetry, masses, or scars  RESP: lungs clear to auscultation - no rales, rhonchi or wheezes  CV: regular rate and rhythm, normal S1 S2, no S3 or S4, no murmur, click or rub, no peripheral edema  ABDOMEN: soft, nontender, no hepatosplenomegaly, no masses and bowel sounds normal  MS: no gross musculoskeletal defects noted, no edema    Data  Laboratory data and imaging listed below was reviewed prior to this encounter.                                   Again, thank you for allowing me to participate in the care of your patient.      Sincerely,    Troy Richter MD    "

## 2025-05-29 NOTE — NURSING NOTE
"Chief Complaint   Patient presents with    RECHECK     /78   Pulse 70   Temp 98.6  F (37  C) (Oral)   Ht 1.676 m (5' 6\")   Wt 60.3 kg (133 lb)   BMI 21.47 kg/m    Thalia Lawton MA on 5/29/2025 at 10:51 AM    "

## 2025-05-29 NOTE — PROGRESS NOTES
"  [facility] INFECTIOUS DISEASE  [address]  [address]  [facility] [address]  Phone: [phone]  Fax: [phone]    Patient:  Asia Brown, Date of birth 1980  Date of Visit:  05/29/2025  Referring Provider Britni Bauer  Reason for visit: recurrent UTI      Assessment & Plan    UTI (urinary tract infection), recurrent  Migraines  History of successive dental interventions in 2024, none anticipated upcoming.    It is possible that her ESBL K pneumoniae was selected for by her UTI treatment in 2025 alone; it is also possible that frequent antibiotics in 2024 during her dental procedures also added selective pressure.  Her report of a post-coital tendency toward UTI in the 6 or so months after ceasing hormonal oral contraceptive therapy (for migraine control) is interesting. While Asia still has menses, she may be experiencing perimenopause and may have relative de-estrogenation of her vaginal tissue.     Her ESBL K pneumoniae has a minimal inhibitory concentration (CHEN) for levofloxacin of 1. While the interpretation of this value is \"resistant,\" I actually suspect that if given levofloxacin her urinary concentration of levofloxacin will far exceed the (CHEN). She received 3 days of Levaquin through 5/23/25    Levaquin was poorly tolerated with a multitude of systemic side effects and stopped 3 days into course which should have been sufficient for treatment of a cystitis. Since stopping Levaquin, most of her side effects have improved. She does report a burning sensation on her flank and frequent urination (however admits she is drinking a lot of water). Physical exam unremarkable - no flank rash or tenderness. No abdominal/suprapubic tenderness. No other symptoms/red flag signs to suggest a UTI    Given chest pressure, we checked an EKG in clinic today. No concerning T wave changes. Qtc 422 msec    Recommendations (from patient instructions):  - Plan to check UA with reflex to " "culture  - If UA is abnormal or positive culture, anticipate treatment as UTI  - If UA is normal and/or culture negative, recommend supportive care  - Follow up with Dr Stephenson 7/16/25    I spent 46 mins on date of encounter between chart review (including prior notes, labs, micro data and imaging), patient visit, documentation and care coordination, including communication with MALA Aquino  Staff Physician, Infectious Diseases  Pager [phone]      Interval History:   Patient was last seen by Dr Stephenson 5/21/25. She was then prescribed Levaquin 750mg/d with plans for a 7 day course  However, only took 3 days of medications as she had multiple side effects - headache, dizziness, sore joints, chest discomfort. Chest discomfort was most worrying symptom to her - described as pressure like sensation over left chest which came on and off. Last dose 5/23/25  With cessation of antibiotics, felt better   Since then, she reports having a burning sensation on her flanks - describes it as \"applying icy hot to my back\"  Reports frequent urination, but attributes that to her drinking more water  No nausea, vomiting. No fevers, chills. No dysuria, urgency, foul smelling urine - symptoms she usually has when she has a UTI    History of Present Illness     Pertinent history obtain from: chart review and patient    This is a 46 y/o woman with PMh migraines presenting for a recent history of frequent urinary tract infections (UTIs).    Prior to Jan 2025, Asia rarely experienced UTI.  2024 was an eventful year. In July 2024, she had a wisdom tooth extraction, and had a complication involving her L maxillary sinus (due to a complication with a L upper wisdom tooth). This was accompanied by 8 weeks of antibiotics. From her report, her sinus symptoms were constant, and while infection for 8 weeks was not suspected, she was maintained on antibiotics during the dental management of her wisdom tooth extraction " complication.  She then proceeded to have a crown placed (on a separate tooth), but had a complicating tooth infection that ultimately led to a root canal and entailed another course of antibiotics. I don't have access to her medication records. She thinks she may have been prescribed doxycycline.    In January 2025, she experienced a UTI. The symptoms were frequency with hesitancy, dysuria. The timing was post-coital. She was initially prescribed amoxicillin, then her provider noticed that the organism was K pneumonia (with intrinsic resistance to amoxicillin) so she was changed to nitrofurantoin. She had full relief of symptoms.    In Mar 2025, she experienced another UTI. The timing also seemed to be post-coital. She again recalls having been prescribed nitrofurantoin. She had complete relief of symptoms.    In May 2025, she again experienced UTI. She was prescribed amox/clavulanate. She took this for 5 days, but then got a call that her isolate was an ESBL K pneumoniae, and she was switched to nitrofurantoin. While on the nitrofurantoin, her symptoms gradually returned. She has had no fever, but she may have some aching in her L flank, though she's not sure if this is attributable to her menses or symptoms from a UTI.    The K pneumoniae sensitivity panel is faxed to us. The images is below.     On additional history, Asia reports that she went off her oral contraceptive in 2024. She had previously taken this for control of her migraines. However, she was ready to stop taking the med as she preferred to avoid systemic exposure to hormones. Her provider prepared her for her migraines to return, but fortunately she's had no worsening of her migraines and she seems happy with having gone off her contraceptive.    She is otherwise healthy and active. Her review of systems apart from the above is negative.    Specialty Problems    None       Physical Exam     Vital signs:  /78   Pulse 70   Temp 98.6  F (37  " C) (Oral)   Ht 1.676 m (5' 6\")   Wt 60.3 kg (133 lb)   BMI 21.47 kg/m      GENERAL: alert and no distress  NECK: no adenopathy, no asymmetry, masses, or scars  RESP: lungs clear to auscultation - no rales, rhonchi or wheezes  CV: regular rate and rhythm, normal S1 S2, no S3 or S4, no murmur, click or rub, no peripheral edema  ABDOMEN: soft, nontender, no hepatosplenomegaly, no masses and bowel sounds normal  MS: no gross musculoskeletal defects noted, no edema    Data   Laboratory data and imaging listed below was reviewed prior to this encounter.                                 "

## 2025-05-31 LAB
ATRIAL RATE - MUSE: 60 BPM
DIASTOLIC BLOOD PRESSURE - MUSE: NORMAL MMHG
INTERPRETATION ECG - MUSE: NORMAL
P AXIS - MUSE: 37 DEGREES
PR INTERVAL - MUSE: 158 MS
QRS DURATION - MUSE: 96 MS
QT - MUSE: 422 MS
QTC - MUSE: 422 MS
R AXIS - MUSE: 93 DEGREES
SYSTOLIC BLOOD PRESSURE - MUSE: NORMAL MMHG
T AXIS - MUSE: 76 DEGREES
VENTRICULAR RATE- MUSE: 60 BPM

## 2025-06-01 ENCOUNTER — HEALTH MAINTENANCE LETTER (OUTPATIENT)
Age: 45
End: 2025-06-01